# Patient Record
Sex: FEMALE | Race: WHITE | NOT HISPANIC OR LATINO | Employment: STUDENT | URBAN - METROPOLITAN AREA
[De-identification: names, ages, dates, MRNs, and addresses within clinical notes are randomized per-mention and may not be internally consistent; named-entity substitution may affect disease eponyms.]

---

## 2024-07-05 ENCOUNTER — HOSPITAL ENCOUNTER (EMERGENCY)
Facility: HOSPITAL | Age: 64
Discharge: HOME | End: 2024-07-05
Payer: OTHER MISCELLANEOUS

## 2024-07-05 VITALS
DIASTOLIC BLOOD PRESSURE: 85 MMHG | BODY MASS INDEX: 33.75 KG/M2 | OXYGEN SATURATION: 96 % | WEIGHT: 210 LBS | SYSTOLIC BLOOD PRESSURE: 150 MMHG | HEART RATE: 96 BPM | HEIGHT: 66 IN | TEMPERATURE: 98 F | RESPIRATION RATE: 18 BRPM

## 2024-07-05 DIAGNOSIS — R07.89 TENDERNESS OF CHEST WALL: ICD-10-CM

## 2024-07-05 DIAGNOSIS — S22.32XA CLOSED FRACTURE OF ONE RIB OF LEFT SIDE, INITIAL ENCOUNTER: Primary | ICD-10-CM

## 2024-07-05 LAB
HCV AB SERPL QL IA: NORMAL
HIV 1+2 AB+HIV1 P24 AG SERPL QL IA: NORMAL

## 2024-07-05 PROCEDURE — 71100 X-RAY EXAM RIBS UNI 2 VIEWS: CPT | Mod: TC,LT

## 2024-07-05 PROCEDURE — 86803 HEPATITIS C AB TEST: CPT | Performed by: STUDENT IN AN ORGANIZED HEALTH CARE EDUCATION/TRAINING PROGRAM

## 2024-07-05 PROCEDURE — 71100 X-RAY EXAM RIBS UNI 2 VIEWS: CPT | Mod: 26,LT,, | Performed by: RADIOLOGY

## 2024-07-05 PROCEDURE — 63600175 PHARM REV CODE 636 W HCPCS: Performed by: NURSE PRACTITIONER

## 2024-07-05 PROCEDURE — 99284 EMERGENCY DEPT VISIT MOD MDM: CPT | Mod: 25

## 2024-07-05 PROCEDURE — 94799 UNLISTED PULMONARY SVC/PX: CPT

## 2024-07-05 PROCEDURE — 99900031 HC PATIENT EDUCATION (STAT)

## 2024-07-05 PROCEDURE — 87389 HIV-1 AG W/HIV-1&-2 AB AG IA: CPT | Performed by: STUDENT IN AN ORGANIZED HEALTH CARE EDUCATION/TRAINING PROGRAM

## 2024-07-05 PROCEDURE — 96372 THER/PROPH/DIAG INJ SC/IM: CPT | Performed by: NURSE PRACTITIONER

## 2024-07-05 RX ORDER — KETOROLAC TROMETHAMINE 30 MG/ML
30 INJECTION, SOLUTION INTRAMUSCULAR; INTRAVENOUS
Status: COMPLETED | OUTPATIENT
Start: 2024-07-05 | End: 2024-07-05

## 2024-07-05 RX ORDER — TRAMADOL HYDROCHLORIDE 50 MG/1
50 TABLET ORAL EVERY 8 HOURS PRN
Qty: 12 TABLET | Refills: 0 | Status: SHIPPED | OUTPATIENT
Start: 2024-07-05

## 2024-07-05 RX ADMIN — KETOROLAC TROMETHAMINE 30 MG: 30 INJECTION, SOLUTION INTRAMUSCULAR; INTRAVENOUS at 05:07
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
## 2024-07-05 NOTE — Clinical Note
"Rosenda Srivastava (Pam)amanda was seen and treated in our emergency department on 7/5/2024.  She may return to work on 07/08/2024.       If you have any questions or concerns, please don't hesitate to call.      Judy CARL    "

## 2024-07-05 NOTE — Clinical Note
"Rosenda Srivastava (Pam)amanda was seen and treated in our emergency department on 7/5/2024.  She may return to work on 07/08/2024.       If you have any questions or concerns, please don't hesitate to call.      Judy DAMON    "

## 2024-07-05 NOTE — Clinical Note
"Rosenda Srivastava (Pam)amanda was seen and treated in our emergency department on 7/5/2024.  She may return to work on 07/15/2024.       If you have any questions or concerns, please don't hesitate to call.      ISAEL Mills RN    "

## 2024-07-05 NOTE — DISCHARGE INSTRUCTIONS
Incentive spirometry as discussed 4 times daily  Cough medicine as prescribed  Motrin 800 as needed for pain to the left ribs  Tramadol for rib pain refractory to Motrin  Return for shortness of breath, worsening pain

## 2025-07-12 ENCOUNTER — HOSPITAL ENCOUNTER (EMERGENCY)
Facility: HOSPITAL | Age: 65
Discharge: HOME | End: 2025-07-12
Attending: EMERGENCY MEDICINE
Payer: COMMERCIAL

## 2025-07-12 VITALS
TEMPERATURE: 99 F | SYSTOLIC BLOOD PRESSURE: 130 MMHG | HEART RATE: 93 BPM | WEIGHT: 208 LBS | HEIGHT: 66 IN | OXYGEN SATURATION: 94 % | BODY MASS INDEX: 33.43 KG/M2 | RESPIRATION RATE: 18 BRPM | DIASTOLIC BLOOD PRESSURE: 76 MMHG

## 2025-07-12 DIAGNOSIS — N94.89 ADNEXAL MASS: Primary | ICD-10-CM

## 2025-07-12 DIAGNOSIS — K57.32 DIVERTICULITIS OF LARGE INTESTINE WITHOUT PERFORATION OR ABSCESS WITHOUT BLEEDING: ICD-10-CM

## 2025-07-12 DIAGNOSIS — R00.0 TACHYCARDIA: ICD-10-CM

## 2025-07-12 DIAGNOSIS — Z76.89 ESTABLISHING CARE WITH NEW DOCTOR, ENCOUNTER FOR: ICD-10-CM

## 2025-07-12 DIAGNOSIS — N30.90 CYSTITIS: ICD-10-CM

## 2025-07-12 DIAGNOSIS — K40.20 BILATERAL INGUINAL HERNIA WITHOUT OBSTRUCTION OR GANGRENE, RECURRENCE NOT SPECIFIED: ICD-10-CM

## 2025-07-12 DIAGNOSIS — R74.8 ELEVATED LIPASE: ICD-10-CM

## 2025-07-12 LAB
ABSOLUTE EOSINOPHIL (OHS): 0.19 K/UL
ABSOLUTE MONOCYTE (OHS): 0.79 K/UL (ref 0.3–1)
ABSOLUTE NEUTROPHIL COUNT (OHS): 5.29 K/UL (ref 1.8–7.7)
ALBUMIN SERPL BCP-MCNC: 4 G/DL (ref 3.5–5.2)
ALP SERPL-CCNC: 101 UNIT/L (ref 40–150)
ALT SERPL W/O P-5'-P-CCNC: 29 UNIT/L (ref 10–44)
ANION GAP (OHS): 12 MMOL/L (ref 8–16)
AST SERPL-CCNC: 23 UNIT/L (ref 11–45)
BACTERIA #/AREA URNS HPF: ABNORMAL /HPF
BASOPHILS # BLD AUTO: 0.08 K/UL
BASOPHILS NFR BLD AUTO: 1 %
BILIRUB SERPL-MCNC: 0.7 MG/DL (ref 0.1–1)
BILIRUB UR QL STRIP.AUTO: NEGATIVE
BUN SERPL-MCNC: 8 MG/DL (ref 8–23)
CALCIUM SERPL-MCNC: 9.2 MG/DL (ref 8.7–10.5)
CHLORIDE SERPL-SCNC: 100 MMOL/L (ref 95–110)
CLARITY UR: ABNORMAL
CO2 SERPL-SCNC: 22 MMOL/L (ref 23–29)
COLOR UR AUTO: YELLOW
CREAT SERPL-MCNC: 0.7 MG/DL (ref 0.5–1.4)
ERYTHROCYTE [DISTWIDTH] IN BLOOD BY AUTOMATED COUNT: 11.9 % (ref 11.5–14.5)
GFR SERPLBLD CREATININE-BSD FMLA CKD-EPI: >60 ML/MIN/1.73/M2
GLUCOSE SERPL-MCNC: 169 MG/DL (ref 70–110)
GLUCOSE UR QL STRIP: NEGATIVE
HCT VFR BLD AUTO: 45.1 % (ref 37–48.5)
HGB BLD-MCNC: 16.2 GM/DL (ref 12–16)
HGB UR QL STRIP: ABNORMAL
IMM GRANULOCYTES # BLD AUTO: 0.02 K/UL (ref 0–0.04)
IMM GRANULOCYTES NFR BLD AUTO: 0.3 % (ref 0–0.5)
KETONES UR QL STRIP: NEGATIVE
LEUKOCYTE ESTERASE UR QL STRIP: ABNORMAL
LIPASE SERPL-CCNC: 293 U/L (ref 4–60)
LYMPHOCYTES # BLD AUTO: 1.57 K/UL (ref 1–4.8)
MCH RBC QN AUTO: 32.9 PG (ref 27–31)
MCHC RBC AUTO-ENTMCNC: 35.9 G/DL (ref 32–36)
MCV RBC AUTO: 92 FL (ref 82–98)
MICROSCOPIC COMMENT: ABNORMAL
NITRITE UR QL STRIP: NEGATIVE
NUCLEATED RBC (/100WBC) (OHS): 0 /100 WBC
PH UR STRIP: 6 [PH]
PLATELET # BLD AUTO: 186 K/UL (ref 150–450)
PMV BLD AUTO: 10.1 FL (ref 9.2–12.9)
POTASSIUM SERPL-SCNC: 4.4 MMOL/L (ref 3.5–5.1)
PROT SERPL-MCNC: 7.6 GM/DL (ref 6–8.4)
PROT UR QL STRIP: NEGATIVE
RBC # BLD AUTO: 4.93 M/UL (ref 4–5.4)
RBC #/AREA URNS HPF: 3 /HPF (ref 0–4)
RELATIVE EOSINOPHIL (OHS): 2.4 %
RELATIVE LYMPHOCYTE (OHS): 19.8 % (ref 18–48)
RELATIVE MONOCYTE (OHS): 9.9 % (ref 4–15)
RELATIVE NEUTROPHIL (OHS): 66.6 % (ref 38–73)
SODIUM SERPL-SCNC: 134 MMOL/L (ref 136–145)
SP GR UR STRIP: <=1.005
SQUAMOUS #/AREA URNS HPF: 11 /HPF
TRICHOMONAS UR QL MICRO: ABNORMAL /HPF
UROBILINOGEN UR STRIP-ACNC: NEGATIVE EU/DL
WBC # BLD AUTO: 7.94 K/UL (ref 3.9–12.7)
WBC #/AREA URNS HPF: 35 /HPF (ref 0–5)

## 2025-07-12 PROCEDURE — 96360 HYDRATION IV INFUSION INIT: CPT

## 2025-07-12 PROCEDURE — 81003 URINALYSIS AUTO W/O SCOPE: CPT | Performed by: EMERGENCY MEDICINE

## 2025-07-12 PROCEDURE — 93005 ELECTROCARDIOGRAM TRACING: CPT | Performed by: INTERNAL MEDICINE

## 2025-07-12 PROCEDURE — 93010 ELECTROCARDIOGRAM REPORT: CPT | Mod: ,,, | Performed by: INTERNAL MEDICINE

## 2025-07-12 PROCEDURE — 87086 URINE CULTURE/COLONY COUNT: CPT | Performed by: EMERGENCY MEDICINE

## 2025-07-12 PROCEDURE — 83690 ASSAY OF LIPASE: CPT | Performed by: EMERGENCY MEDICINE

## 2025-07-12 PROCEDURE — 25000003 PHARM REV CODE 250: Performed by: STUDENT IN AN ORGANIZED HEALTH CARE EDUCATION/TRAINING PROGRAM

## 2025-07-12 PROCEDURE — 80053 COMPREHEN METABOLIC PANEL: CPT | Performed by: EMERGENCY MEDICINE

## 2025-07-12 PROCEDURE — 94760 N-INVAS EAR/PLS OXIMETRY 1: CPT

## 2025-07-12 PROCEDURE — 25500020 PHARM REV CODE 255: Performed by: STUDENT IN AN ORGANIZED HEALTH CARE EDUCATION/TRAINING PROGRAM

## 2025-07-12 PROCEDURE — 99285 EMERGENCY DEPT VISIT HI MDM: CPT | Mod: 25

## 2025-07-12 PROCEDURE — 85025 COMPLETE CBC W/AUTO DIFF WBC: CPT | Performed by: EMERGENCY MEDICINE

## 2025-07-12 PROCEDURE — 74177 CT ABD & PELVIS W/CONTRAST: CPT | Mod: TC,FY

## 2025-07-12 RX ORDER — CIPROFLOXACIN 500 MG/1
500 TABLET, FILM COATED ORAL 2 TIMES DAILY
Qty: 20 TABLET | Refills: 0 | Status: SHIPPED | OUTPATIENT
Start: 2025-07-12 | End: 2025-07-22

## 2025-07-12 RX ORDER — LOSARTAN POTASSIUM 50 MG/1
25 TABLET ORAL 2 TIMES DAILY
COMMUNITY

## 2025-07-12 RX ORDER — METHOCARBAMOL 750 MG/1
750 TABLET, FILM COATED ORAL 3 TIMES DAILY PRN
COMMUNITY

## 2025-07-12 RX ORDER — IBUPROFEN 800 MG/1
800 TABLET, FILM COATED ORAL 3 TIMES DAILY
COMMUNITY

## 2025-07-12 RX ORDER — CIPROFLOXACIN 250 MG/1
500 TABLET, FILM COATED ORAL
Status: COMPLETED | OUTPATIENT
Start: 2025-07-12 | End: 2025-07-12

## 2025-07-12 RX ORDER — METRONIDAZOLE 250 MG/1
500 TABLET ORAL
Status: COMPLETED | OUTPATIENT
Start: 2025-07-12 | End: 2025-07-12

## 2025-07-12 RX ORDER — METFORMIN HYDROCHLORIDE 1000 MG/1
1000 TABLET ORAL 2 TIMES DAILY WITH MEALS
COMMUNITY

## 2025-07-12 RX ORDER — METRONIDAZOLE 500 MG/1
500 TABLET ORAL EVERY 8 HOURS
Qty: 30 TABLET | Refills: 0 | Status: SHIPPED | OUTPATIENT
Start: 2025-07-12 | End: 2025-07-22

## 2025-07-12 RX ORDER — ATORVASTATIN CALCIUM 20 MG/1
20 TABLET, FILM COATED ORAL NIGHTLY
COMMUNITY

## 2025-07-12 RX ORDER — ONDANSETRON 4 MG/1
4 TABLET, FILM COATED ORAL EVERY 6 HOURS PRN
Qty: 12 TABLET | Refills: 0 | Status: SHIPPED | OUTPATIENT
Start: 2025-07-12

## 2025-07-12 RX ADMIN — METRONIDAZOLE 500 MG: 250 TABLET ORAL at 07:07

## 2025-07-12 RX ADMIN — CIPROFOLXACIN 500 MG: 250 TABLET ORAL at 07:07

## 2025-07-12 RX ADMIN — SODIUM CHLORIDE 1000 ML: 9 INJECTION, SOLUTION INTRAVENOUS at 06:07

## 2025-07-12 RX ADMIN — IOHEXOL 100 ML: 350 INJECTION, SOLUTION INTRAVENOUS at 06:07

## 2025-07-12 NOTE — DISCHARGE INSTRUCTIONS
Follow up with Gynecology as referred for further evaluation of right adnexa mass.    Take antibiotics as prescribed to treat diverticulitis    May take Zofran every 6 hours if nausea   Yes

## 2025-07-12 NOTE — ED PROVIDER NOTES
Encounter Date: 7/12/2025       History     Chief Complaint   Patient presents with    Fever    Abdominal Pain    Constipation    Hemorrhoids     Fever x 3 days, no BM in 3 days, hemorrhoid flare up and blood noted from rectum approximately 4 times. Lower abdominal pain. No relief with 800mg ibuprofen every 8 hours.      64-year-old female with a significant history of hypertension, hyperlipidemia, diabetes, hemorrhoids.  She presents to ED with complaint of 3 day history of constant left lower quadrant abdominal pain. She also reports associated subjective fever, and constipation. Last bowel movement was 3 days ago, which is unusual for her she tells me that she usually has a bowel movement every day. She also reports 1 time episode of small bright red blood per rectum with the associated rectal discomfort upon attempt to have a bowel movement yesterday. She denies associated nausea, emesis. She has been taking ibuprofen 800s daily for the past 3 days for abdominal pain with relief. Surgical history includes abdominal hernia repair.     The history is provided by the patient. No  was used.     Review of patient's allergies indicates:   Allergen Reactions    Amoxicillin Rash     Past Medical History:   Diagnosis Date    Diabetes mellitus     Hyperlipidemia     Hypertension      Past Surgical History:   Procedure Laterality Date    HERNIA REPAIR       No family history on file.  Social History[1]  Review of Systems   Constitutional: Negative.    HENT: Negative.     Eyes: Negative.    Respiratory: Negative.     Cardiovascular: Negative.    Gastrointestinal:  Positive for abdominal pain, blood in stool, constipation and rectal pain.   Endocrine: Negative.    Genitourinary: Negative.    Musculoskeletal: Negative.    Skin: Negative.    Neurological: Negative.    Hematological: Negative.    Psychiatric/Behavioral: Negative.     All other systems reviewed and are negative.      Physical Exam     Initial  Vitals [07/12/25 0556]   BP Pulse Resp Temp SpO2   (!) 164/91 105 19 99.2 °F (37.3 °C) 95 %      MAP       --         Physical Exam    Nursing note and vitals reviewed.  Constitutional: She appears well-developed and well-nourished.   HENT:   Head: Normocephalic.   Eyes: Pupils are equal, round, and reactive to light.   Neck:   Normal range of motion.  Cardiovascular:  Normal rate.           Pulmonary/Chest: Breath sounds normal. No respiratory distress. She has no wheezes.   Abdominal: Abdomen is soft. Bowel sounds are normal. She exhibits no distension. There is abdominal tenderness (Left lower quadrant). There is no rebound and no guarding.   Genitourinary:    Genitourinary Comments: Rectal exam reveals nonthrombosed external hemorrhoids     Musculoskeletal:         General: Normal range of motion.      Cervical back: Normal range of motion.     Neurological: She is alert and oriented to person, place, and time. She has normal strength. GCS score is 15. GCS eye subscore is 4. GCS verbal subscore is 5. GCS motor subscore is 6.   Skin: Skin is warm. Capillary refill takes less than 2 seconds.   Psychiatric: She has a normal mood and affect.         ED Course   Procedures  Labs Reviewed   COMPREHENSIVE METABOLIC PANEL - Abnormal       Result Value    Sodium 134 (*)     Potassium 4.4      Chloride 100      CO2 22 (*)     Glucose 169 (*)     BUN 8      Creatinine 0.7      Calcium 9.2      Protein Total 7.6      Albumin 4.0      Bilirubin Total 0.7            AST 23      ALT 29      Anion Gap 12      eGFR >60     LIPASE - Abnormal    Lipase Level 293 (*)    URINALYSIS, REFLEX TO URINE CULTURE - Abnormal    Color, UA Yellow      Appearance, UA Hazy (*)     pH, UA 6.0      Spec Grav UA <=1.005 (*)     Protein, UA Negative      Glucose, UA Negative      Ketones, UA Negative      Bilirubin, UA Negative      Blood, UA Trace (*)     Nitrites, UA Negative      Urobilinogen, UA Negative      Leukocyte Esterase, UA 3+  (*)    CBC WITH DIFFERENTIAL - Abnormal    WBC 7.94      RBC 4.93      HGB 16.2 (*)     HCT 45.1      MCV 92      MCH 32.9 (*)     MCHC 35.9      RDW 11.9      Platelet Count 186      MPV 10.1      Nucleated RBC 0      Neut % 66.6      Lymph % 19.8      Mono % 9.9      Eos % 2.4      Basophil % 1.0      Imm Grans % 0.3      Neut # 5.29      Lymph # 1.57      Mono # 0.79      Eos # 0.19      Baso # 0.08      Imm Grans # 0.02     URINALYSIS MICROSCOPIC - Abnormal    RBC, UA 3      WBC, UA 35 (*)     Bacteria, UA Moderate (*)     Squamous Epithelial Cells, UA 11      Trichomonas, UA Few (*)     Microscopic Comment       CULTURE, URINE   CBC W/ AUTO DIFFERENTIAL    Narrative:     The following orders were created for panel order CBC W/ AUTO DIFFERENTIAL.  Procedure                               Abnormality         Status                     ---------                               -----------         ------                     CBC with Differential[[id number]]       Abnormal            Final result                 Please view results for these tests on the individual orders.   [facility] URINE HOLD          Imaging Results              CT Abdomen Pelvis With IV Contrast NO Oral Contrast (Final result)  Result time 07/12/25 07:14:58      Final result by Courtney Daniels MD (07/12/25 07:14:58)                   Impression:      1. Imaging findings which are most compatible with acute sigmoid diverticulitis.  No complicating features such as pneumoperitoneum or abscess formation.  2. Hepatosplenomegaly and hepatic steatosis.  3. 22 mm right adnexal hypodensity, most likely an ovarian cyst.  Consider additional characterization with outpatient pelvic ultrasound in this postmenopausal patient.      Electronically signed by: Osei Daniels MD  Date:    07/12/2025  Time:    07:14               Narrative:    EXAMINATION:  CT ABDOMEN PELVIS WITH IV CONTRAST    CLINICAL HISTORY:  LLQ abdominal pain;    TECHNIQUE:  Low dose 3.75 mm  contiguous axial images, sagittal and coronal reformations were obtained from the lung bases to the pubic symphysis following the IV administration of 75 mL of Omnipaque 350 .  Oral contrast was not given.    COMPARISON:  None    FINDINGS:  There are mitral annulus calcifications.  There is linear atelectasis versus fibrosis present within the left lingula.    There is only hepatomegaly.  There is diffuse hepatic steatosis.  No hepatic mass identified.  The hepatic veins are patent.  The portal vein is patent.  The gallbladder is unremarkable.  No biliary dilatation appreciated.  There is a 19 mm short axis portacaval lymph node visible on series 2, image 27, nonspecific there is an 11 mm short axis aortocaval lymph node visible on series 2, image 29.  The pancreas is unremarkable.  The spleen is enlarged measuring 14.7 cm in craniocaudad dimension on series 4, image 72 there is a small focus of accessory splenic tissue noted anterior to the spleen on series 2, image 28.  The stomach, duodenal C-loop, and adrenal glands are unremarkable.    There is atherosclerotic calcification present within the abdominal aorta and common iliac arteries.  The kidneys show no evidence of stones, hydronephrosis, or solid mass.  The ureters are normal in caliber and course without definite stones appreciated.  The urinary bladder is unremarkable.    There is a 22 mm right adnexal hypodensity present, possibly a right ovarian cyst.  Consider follow-up outpatient pelvic ultrasound for further characterization of the right ovary in this postmenopausal patient.  The left ovary is unremarkable.  The uterus is unremarkable.    The appendix is visualized and shows no inflammatory changes.  The most significant abnormality relates to the presence of multiple colonic diverticula within the sigmoid colon.  There is pericolic fat stranding and adjacent superficial fascial fluid/thickening noted adjacent to the proximal sigmoid colon in an area  where numerous colonic diverticula are observed (series 4 images 47-57).  These findings are most compatible with acute diverticulitis.  No complicating features such as abscess formation or pneumoperitoneum.  No inguinal lymphadenopathy.  There are small bilateral fat containing inguinal hernias.  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  There is bilateral neuroforaminal stenosis present at L4-L5 and there is left neuroforaminal stenosis at L5-S1 which could produce symptoms referable to the L4 and left L5 nerves.  Please correlate clinically.                                       Medications   sodium chloride 0.9% bolus 1,000 mL 1,000 mL (0 mLs Intravenous Stopped 7/12/25 0723)   iohexoL (OMNIPAQUE 350) injection 100 mL (100 mLs Intravenous Given 7/12/25 0657)   ciprofloxacin HCl tablet 500 mg (500 mg Oral Given 7/12/25 0743)   metroNIDAZOLE tablet 500 mg (500 mg Oral Given 7/12/25 0743)     Medical Decision Making    See H&P above for details. Ddx diverticulitis, diverticulosis, others  UA with a positive leukocyte esterase, pyuria suggestive of cystitis, serum WBC normal. Lipase elevated 293, CT reveals findings suggestive of acute sigmoid diverticulitis without abscess or pneumoperitoneum. 22 mm right adnexal hypodensity, most likely an ovarian cyst. Consider additional characterization with outpatient pelvic ultrasound in this postmenopausal patient.  Findings discussed with the patient.  Received Cipro, Flagyl in the ED.  Rx Cipro Flagyl, Zofran p.r.n. Given referral to follow up with the Gynecology for further evaluation. Very strict return precautions discussed she verbalized understanding    Amount and/or Complexity of Data Reviewed  Labs:  ordered.  Radiology: ordered.    Risk  Prescription drug management.                                          Clinical Impression:  Final diagnoses:  [R00.0] Tachycardia  [K57.32] Diverticulitis of large intestine without perforation or abscess without bleeding  [N94.89] Adnexal mass - CT findings- 22 mm right adnexal hypodensity, most likely an ovarian cyst (Primary)  [Z76.89] Establishing care with new doctor, encounter for  [K40.20] Bilateral inguinal hernia without obstruction or gangrene, recurrence not specified  [N30.90] Cystitis  [R74.8] Elevated lipase                       [1]   Social History  Tobacco Use    Smoking status: Every Day     Current packs/day: 0.50     Types: Cigarettes    Smokeless tobacco: Never   Substance Use Topics    Alcohol use: Yes     Comment: social    Drug use: Never        Kaleb De Jesus MD  07/12/25 0753

## 2025-07-14 LAB
BACTERIA UR CULT: ABNORMAL
OHS QRS DURATION: 78 MS
OHS QTC CALCULATION: 442 MS